# Patient Record
Sex: MALE | Race: OTHER
[De-identification: names, ages, dates, MRNs, and addresses within clinical notes are randomized per-mention and may not be internally consistent; named-entity substitution may affect disease eponyms.]

---

## 2019-12-10 ENCOUNTER — HOSPITAL ENCOUNTER (EMERGENCY)
Dept: HOSPITAL 15 - ER | Age: 29
Discharge: HOME | End: 2019-12-10
Payer: SELF-PAY

## 2019-12-10 VITALS — SYSTOLIC BLOOD PRESSURE: 142 MMHG | DIASTOLIC BLOOD PRESSURE: 85 MMHG

## 2019-12-10 VITALS — HEIGHT: 66 IN | BODY MASS INDEX: 35.36 KG/M2 | WEIGHT: 220 LBS
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
## 2019-12-10 DIAGNOSIS — S00.93XA: Primary | ICD-10-CM

## 2019-12-10 PROCEDURE — 72125 CT NECK SPINE W/O DYE: CPT

## 2019-12-10 PROCEDURE — 71045 X-RAY EXAM CHEST 1 VIEW: CPT

## 2019-12-10 PROCEDURE — 70450 CT HEAD/BRAIN W/O DYE: CPT

## 2024-09-19 ENCOUNTER — HOSPITAL ENCOUNTER (EMERGENCY)
Facility: HOSPITAL | Age: 34
Discharge: HOME | End: 2024-09-19
Attending: STUDENT IN AN ORGANIZED HEALTH CARE EDUCATION/TRAINING PROGRAM

## 2024-09-19 ENCOUNTER — APPOINTMENT (OUTPATIENT)
Dept: GENERAL RADIOLOGY | Facility: HOSPITAL | Age: 34
End: 2024-09-19
Payer: MEDICAID

## 2024-09-19 VITALS
RESPIRATION RATE: 18 BRPM | SYSTOLIC BLOOD PRESSURE: 126 MMHG | DIASTOLIC BLOOD PRESSURE: 86 MMHG | OXYGEN SATURATION: 99 % | TEMPERATURE: 99.1 F | HEART RATE: 56 BPM

## 2024-09-19 DIAGNOSIS — S60.122A SUBUNGUAL HEMATOMA OF LEFT INDEX FINGER: Primary | ICD-10-CM

## 2024-09-19 DIAGNOSIS — S67.191A CRUSHING INJURY OF LEFT INDEX FINGER, INITIAL ENCOUNTER: ICD-10-CM

## 2024-09-19 PROCEDURE — 73140 X-RAY EXAM OF FINGER(S): CPT

## 2024-09-19 PROCEDURE — 99283 EMERGENCY DEPT VISIT LOW MDM: CPT | Performed by: STUDENT IN AN ORGANIZED HEALTH CARE EDUCATION/TRAINING PROGRAM

## 2024-09-19 RX ORDER — ACETAMINOPHEN 500 MG
1000 TABLET ORAL ONCE
Status: COMPLETED | OUTPATIENT
Start: 2024-09-19 | End: 2024-09-19

## 2024-09-19 RX ADMIN — ACETAMINOPHEN 1000 MG: 500 TABLET ORAL at 20:41

## 2024-09-19 RX ADMIN — IBUPROFEN 600 MG: 400 TABLET, FILM COATED ORAL at 20:41

## 2024-11-19 ENCOUNTER — TELEPHONE (OUTPATIENT)
Dept: SURGERY | Facility: CLINIC | Age: 34
End: 2024-11-19

## 2024-11-19 NOTE — TELEPHONE ENCOUNTER
Attempted to contact patient to reschedule appointment to come in 12/3 anytime or 12/17 before 10 am at [place] location. Unable to leave voicemail. Voicemail is not set up.